# Patient Record
Sex: MALE | Race: WHITE | NOT HISPANIC OR LATINO | Employment: FULL TIME | URBAN - METROPOLITAN AREA
[De-identification: names, ages, dates, MRNs, and addresses within clinical notes are randomized per-mention and may not be internally consistent; named-entity substitution may affect disease eponyms.]

---

## 2023-02-19 ENCOUNTER — OFFICE VISIT (OUTPATIENT)
Dept: URGENT CARE | Facility: URGENT CARE | Age: 18
End: 2023-02-19
Payer: OTHER GOVERNMENT

## 2023-02-19 VITALS
SYSTOLIC BLOOD PRESSURE: 136 MMHG | DIASTOLIC BLOOD PRESSURE: 76 MMHG | TEMPERATURE: 98.1 F | OXYGEN SATURATION: 100 % | HEART RATE: 96 BPM | WEIGHT: 182 LBS

## 2023-02-19 DIAGNOSIS — H61.22 IMPACTED CERUMEN OF LEFT EAR: Primary | ICD-10-CM

## 2023-02-19 PROCEDURE — 69209 REMOVE IMPACTED EAR WAX UNI: CPT | Mod: LT

## 2023-02-19 PROCEDURE — 99202 OFFICE O/P NEW SF 15 MIN: CPT | Mod: 25

## 2023-02-19 RX ORDER — BUDESONIDE, GLYCOPYRROLATE, AND FORMOTEROL FUMARATE 160; 9; 4.8 UG/1; UG/1; UG/1
AEROSOL, METERED RESPIRATORY (INHALATION)
COMMUNITY
Start: 2022-06-24

## 2023-02-19 RX ORDER — FLUTICASONE PROPIONATE 50 MCG
SPRAY, SUSPENSION (ML) NASAL
COMMUNITY
Start: 2022-06-24

## 2023-02-19 RX ORDER — EPINEPHRINE 0.3 MG/.3ML
INJECTION SUBCUTANEOUS
COMMUNITY
Start: 2014-12-22

## 2023-02-19 RX ORDER — LIDOCAINE/PRILOCAINE 2.5 %-2.5%
CREAM (GRAM) TOPICAL
COMMUNITY

## 2023-02-19 RX ORDER — ALBUTEROL SULFATE 90 UG/1
AEROSOL, METERED RESPIRATORY (INHALATION)
COMMUNITY
Start: 2015-10-01

## 2023-02-19 NOTE — PROGRESS NOTES
"URGENT CARE  Assessment & Plan   Assessment:   Sushil Mcgill is a 17 year old male who's clinical presentation today is consistent with:   1. Impacted cerumen of left ear  - [place] REMOVAL IMPACTED CERUMEN IRRIGATION/LVG UNILAT  - REMOVE IMPACTED CERUMEN    No alarm signs or symptoms present   Differential Diagnoses for this patient's CC include    AOM, OME, otitis externa, viral URI, other bacterial pathology,   meniere's , myringitis, mastoiditis   Labyrinthitis, vestibular pathology, eustachian tube dysfunction    Sensory hearing loss, tinnitus,    Plan:  Treated patient's ceruminosis in clinic today with ear irrigation/lavage and curette, post procedure patient's ear symptoms were improved, will continue to treat patient symptomatically and supportively. This will include: warm packs, OTC debrox, using antihistamines and decongestants as needed.   Educated patient  that should  ear pain continue over the next few days, it is reasonable to follow up with PCP or ENT specialist for further evaluation    Patient  is  agreeable to treatment plan and state they will follow-up if symptoms do not improve and/or if symptoms worsen (see patient's AVS 'monitor for' section for specific patient instructions given and discussed regarding what to watch for and when to follow up)    Medications ordered are listed above, please see AVS for patient's specific and personalized discharge instructions given     [facility] [place]      ______________________________________________________________________        Subjective  Subjective     HPI: Sushil Mcgill  is a 17 year old  male who presents today for evaluation the following concerns:   Patient presents today  endorsing ear pain on the left side, patient states the pain started yesterday, on 2/18/23   Symptoms include ear pain, plugged sensation}, muffled hearing and \"clogged\"   Patient denies any URI symptoms such as runny nose, " headaches, fevers, congestion- nasal.   Patient denies external ears are tender to touch.   Patient endorses wearing earbuds daily and wearing ear plugs to sleep at night   Patient states he does not clean his ears       Allergies   Allergen Reactions     Seasonal Allergies      Sulfa Drugs      Patient Active Problem List   Diagnosis     Changing skin lesion       Review of Systems:  Pertinent review of systems as reflected in HPI, otherwise negative.     Objective  Objective    Physical Exam:  Vitals:    02/19/23 1149   BP: 136/76   Pulse: 96   Temp: 98.1  F (36.7  C)   TempSrc: Tympanic   SpO2: 100%   Weight: 82.6 kg (182 lb)      General: Alert and oriented, no acute distress, Vital signs reviewed: afebrile,  normotensive   Psy/mental status: Cooperative, nonanxious  SKIN: Intact, no rashes  EYES: EOMs intact, PERRLA bilaterally   Conjunctiva: Clear bilaterally, no injection or erythema present  EARS:   Right ear exam:   TM  intact, translucent gray in color with normal landmarks present no erythema  or bulging tympanic membrane   Canal is  without swelling, however have a mild} amount of cerumen, no  impaction, no canal occlusion  No} drainage/discharge noted, no tenderness to palpation , no foreign bodies  present     Left ear exam: not visualized secondary to cerumen,   ceruminosis impacting canal - cerumen removed by fluid flush irrigation and manually by lighted curette} . Patient tolerated procedure well.      Post procedure re-exam reveals:  ear canal with slight erythema following removal of  wax, normal TM noted, clear translucent without erythema. Bony landmarks visible.    NOSE:  mucosa moist                No frontal or maxillary sinus tenderness present bilaterally  MOUTH/THROAT: lips, tongue, & oral mucosa appear normal upon inspection                Posterior oropharynx is erythematous but without exudate, lesions or tonsillar  Edema, no dysphonia   NECK: supple, has full range of motion with no  meningeal signs              No lymphadenopathy present  LUNG: normal work of breathing, good respiratory effort without retractions, good air  movement, non labored, inspection reveals normal chest expansion w/  inspiration         I explained my diagnostic considerations and recommendations to the patient, who voiced understanding and agreement with the treatment plan.   All questions were answered.   We discussed potential side effects, risks and benefits of any prescribed or recommended therapies, as well as expectations for response to treatments.  Please see AVS for any patient instructions & handouts given.   Patient was advised to contact the Nurse Care Line, their Primary Care provider, Urgent Care, or the Emergency Department if there are new or worsening symptoms, or call 911 for emergencies.        ______________________________________________________________________          Patient Instructions   Diagnosis: cerumen, ear wax impaction   Today we did:  Fluid flush of your ears, for ear wax removal   Plan:     The muffled, congested, clogged feeling can last until tomorrow while water is absorbed or evaporates from ear canal tissue     At home treatment: after showering / bathing gently clean ears daily     Can use counter Debrox or few drops of quality Olive Oil each week to prevent earwax buildup.     Mineral oil will also soften ear wax so that a gentle warm water flush can remove debris. (You should NOT use mineral oil with perforated ear drums, PE tubes, or damaged ear drum)      flush ears in shower once weekly to avoid impaction of wax buildup    Qtips are not recommenced as they remove superficial wax, but also pushes the rest of the wax deeper into the ear canal.  Monitor for:     Continued muffled hearing    Decreased hearing    Ringing in the ear    Headaches     Fevers, chills     Perforation of ear drum    Purulent drainage from ears         CERUMEN IMPACTED (EAR WAX)  What is ear wax?  Ear  wax, also called cerumen, is made by the body to protect the ears.   The ear wax has both lubricating and antibacterial properties.  Most of the time, the old ear wax is moved through the ear canal by motions from chewing and other jaw movements and as the skin of the ear canal grows from the inside out.   At that time, it reaches the outside of the ear and flakes off.   Ear wax is produced in the outer part of the ear canal, not deep inside the ear.  Some people are just high producers of ear wax     What does it mean when ear wax becomes impacted?  We say that ear wax is impacted when it has built up in the ear canal to such a point that there may be signs that something is not quite right.   It is important to note that, for most people, ears might never need cleaning--they are designed to clean themselves.

## 2023-02-19 NOTE — PATIENT INSTRUCTIONS
Diagnosis: cerumen, ear wax impaction   Today we did:  Fluid flush of your ears, for ear wax removal   Plan:   The muffled, congested, clogged feeling can last until tomorrow while water is absorbed or evaporates from ear canal tissue   At home treatment: after showering / bathing gently clean ears daily   Can use counter Debrox or few drops of quality Olive Oil each week to prevent earwax buildup.   Mineral oil will also soften ear wax so that a gentle warm water flush can remove debris. (You should NOT use mineral oil with perforated ear drums, PE tubes, or damaged ear drum)    flush ears in shower once weekly to avoid impaction of wax buildup  Qtips are not recommenced as they remove superficial wax, but also pushes the rest of the wax deeper into the ear canal.  Monitor for:   Continued muffled hearing  Decreased hearing  Ringing in the ear  Headaches   Fevers, chills   Perforation of ear drum  Purulent drainage from ears         CERUMEN IMPACTED (EAR WAX)  What is ear wax?  Ear wax, also called cerumen, is made by the body to protect the ears.   The ear wax has both lubricating and antibacterial properties.  Most of the time, the old ear wax is moved through the ear canal by motions from chewing and other jaw movements and as the skin of the ear canal grows from the inside out.   At that time, it reaches the outside of the ear and flakes off.   Ear wax is produced in the outer part of the ear canal, not deep inside the ear.  Some people are just high producers of ear wax     What does it mean when ear wax becomes impacted?  We say that ear wax is impacted when it has built up in the ear canal to such a point that there may be signs that something is not quite right.   It is important to note that, for most people, ears might never need cleaning--they are designed to clean themselves.